# Patient Record
Sex: MALE | Race: BLACK OR AFRICAN AMERICAN | Employment: FULL TIME | URBAN - METROPOLITAN AREA
[De-identification: names, ages, dates, MRNs, and addresses within clinical notes are randomized per-mention and may not be internally consistent; named-entity substitution may affect disease eponyms.]

---

## 2024-05-20 ENCOUNTER — OFFICE VISIT (OUTPATIENT)
Age: 29
End: 2024-05-20

## 2024-05-20 VITALS
OXYGEN SATURATION: 97 % | SYSTOLIC BLOOD PRESSURE: 125 MMHG | TEMPERATURE: 98.7 F | HEART RATE: 105 BPM | HEIGHT: 72 IN | WEIGHT: 210 LBS | BODY MASS INDEX: 28.44 KG/M2 | RESPIRATION RATE: 18 BRPM | DIASTOLIC BLOOD PRESSURE: 76 MMHG

## 2024-05-20 DIAGNOSIS — R22.9 MULTIPLE SKIN NODULES: Primary | ICD-10-CM

## 2024-05-20 NOTE — PATIENT INSTRUCTIONS
Small skin lesions left upper thigh/groin -  Appearance consistent with a possible folliculitis  If you would like to proceed with any STI testing please call or come back to the clinic  Monitor the area  If any concerns please call or return to clinic

## 2024-05-20 NOTE — PROGRESS NOTES
Iris Elizalde (:  1995) is a 28 y.o. male,New patient, here for evaluation of the following chief complaint(s):  Rash (Patient has rash in groid/thigh area - left side - noticed this morning)        SUBJECTIVE/OBJECTIVE:    History provided by:  Patient  Rash         28 y.o. male presents with symptoms of two small skin lesions left upper thigh, almost into his groin. Noticed this morning. They are not painful, no itching, no openings in skin, no drainage, no redness. No other abnormal areas. He denies any penile discharge. He denies any urinary symptoms. No denies any fever, chills.         Vitals:    24 1310   BP: 125/76   Site: Left Upper Arm   Position: Sitting   Cuff Size: Large Adult   Pulse: (!) 105   Resp: 18   Temp: 98.7 °F (37.1 °C)   TempSrc: Oral   SpO2: 97%   Weight: 95.3 kg (210 lb)   Height: 1.829 m (6')       No results found for this visit on 24.     Physical Exam  Constitutional:       General: He is not in acute distress.     Appearance: Normal appearance. He is not ill-appearing or toxic-appearing.   HENT:      Head: Normocephalic and atraumatic.   Pulmonary:      Effort: Pulmonary effort is normal.      Breath sounds: Normal breath sounds.   Skin:     General: Skin is warm and dry.      Comments: Two small nodules left upper medial thigh high up almost in groin. No erythema, two small nodules about 1-2 mm in diameter. No surrounding erythema. No comedones/boils seen. Nontender.   Neurological:      General: No focal deficit present.      Mental Status: He is alert and oriented to person, place, and time.   Psychiatric:         Mood and Affect: Mood normal.         Behavior: Behavior normal.         Thought Content: Thought content normal.         Judgment: Judgment normal.          ASSESSMENT/PLAN:  Visit Diagnoses and Associated Orders       Multiple skin nodules    -  Primary             Small skin lesions left upper thigh/groin -  Appearance consistent with a possible